# Patient Record
Sex: FEMALE | URBAN - METROPOLITAN AREA
[De-identification: names, ages, dates, MRNs, and addresses within clinical notes are randomized per-mention and may not be internally consistent; named-entity substitution may affect disease eponyms.]

---

## 2019-04-17 ENCOUNTER — OFFICE VISIT (OUTPATIENT)
Dept: URBAN - METROPOLITAN AREA CLINIC 33 | Facility: CLINIC | Age: 74
End: 2019-04-17
Payer: COMMERCIAL

## 2019-04-17 DIAGNOSIS — E11.9 TYPE 2 DIABETES MELLITUS W/O COMPLICATION: Primary | ICD-10-CM

## 2019-04-17 DIAGNOSIS — H25.13 AGE-RELATED NUCLEAR CATARACT, BILATERAL: ICD-10-CM

## 2019-04-17 PROCEDURE — 99204 OFFICE O/P NEW MOD 45 MIN: CPT | Performed by: OPTOMETRIST

## 2019-04-17 ASSESSMENT — INTRAOCULAR PRESSURE
OS: 18
OD: 17

## 2019-04-17 NOTE — IMPRESSION/PLAN
Impression: Anatomical narrow angle of bilateral eye: H40.033. Plan: Patent PI OU. No glaucomatous changes to [place] 74. IOP WNL.  Recommend return for dilation

## 2019-04-17 NOTE — IMPRESSION/PLAN
Impression: Type 2 diabetes mellitus w/o complication: F58.4. Plan: Patient deferred dilation. No bleeding detected at this time. Recommend patient return for dilated exam to reevaluate diabetes. Diabetes w/o Complications: No signs of diabetic retinopathy noted. No treatment necessary at this time. Patient was instructed to monitor vision for sudden changes and to call if visual changes noted. Discussed ocular and systemic benefits of blood sugar control. Continue management with PCP. Letter sent to PCP regarding status of ocular health.

## 2021-05-06 ENCOUNTER — OFFICE VISIT (OUTPATIENT)
Dept: URBAN - METROPOLITAN AREA CLINIC 33 | Facility: CLINIC | Age: 76
End: 2021-05-06
Payer: COMMERCIAL

## 2021-05-06 PROCEDURE — 99214 OFFICE O/P EST MOD 30 MIN: CPT | Performed by: OPTOMETRIST

## 2021-05-06 ASSESSMENT — INTRAOCULAR PRESSURE
OS: 18
OD: 17

## 2021-05-06 NOTE — IMPRESSION/PLAN
Impression: Type 2 diabetes mellitus w/o complication: M67.3. Plan: Diabetes w/o Complications: No signs of diabetic retinopathy noted. No treatment necessary at this time. Patient was instructed to monitor vision for sudden changes and to call if visual changes noted. Discussed ocular and systemic benefits of blood sugar control. Continue management with PCP. Letter sent to PCP regarding status of ocular health.

## 2021-05-06 NOTE — IMPRESSION/PLAN
Impression: Refractive amblyopia, right eye: H53.021. Plan: Refractive amblyopia OD. No treatment at this time.  12 month f/u

## 2021-05-06 NOTE — IMPRESSION/PLAN
Impression: Anatomical narrow angle of bilateral eye: H40.033.

s/p LPI OU 08/10/17 w/ Dr. Carl Prior: Discussed condition with patient. LPI is patent in the right eye only and not patent in the left. Recommend return visit to Dr. Renny Ramos to evaluate and perform LPI OS if needed.

## 2021-05-11 ENCOUNTER — OFFICE VISIT (OUTPATIENT)
Dept: URBAN - METROPOLITAN AREA CLINIC 33 | Facility: CLINIC | Age: 76
End: 2021-05-11
Payer: COMMERCIAL

## 2021-05-11 PROCEDURE — 76514 ECHO EXAM OF EYE THICKNESS: CPT | Performed by: OPHTHALMOLOGY

## 2021-05-11 PROCEDURE — 99213 OFFICE O/P EST LOW 20 MIN: CPT | Performed by: OPHTHALMOLOGY

## 2021-05-11 PROCEDURE — 92133 CPTRZD OPH DX IMG PST SGM ON: CPT | Performed by: OPHTHALMOLOGY

## 2021-05-11 PROCEDURE — 92020 GONIOSCOPY: CPT | Performed by: OPHTHALMOLOGY

## 2021-05-11 ASSESSMENT — INTRAOCULAR PRESSURE
OS: 15
OD: 15
OD: 17
OS: 16

## 2021-05-11 NOTE — IMPRESSION/PLAN
Impression: Anatomical narrow angle of bilateral eye: H40.033.

s/p LPI OU 08/10/17 w/ Dr. Gail Spears: Discussed condition with patient. LPI patent OU, shallow periphery OS. No LPI needed at this time. Will continue to monitor. Pt defer dilation today.

## 2021-05-11 NOTE — IMPRESSION/PLAN
Impression: Type 2 diabetes mellitus w/o complication: O46.7. Plan: Diabetes w/o Complications: No signs of diabetic retinopathy noted. No treatment necessary at this time. Patient was instructed to monitor vision for sudden changes and to call if visual changes noted. Discussed ocular and systemic benefits of blood sugar control.  * NEEDS DFE at Next visit*

## 2021-05-11 NOTE — IMPRESSION/PLAN
Impression: Refractive amblyopia, right eye: H53.021. Plan: Refractive amblyopia OD. No treatment at this time.

## 2021-05-25 ENCOUNTER — OFFICE VISIT (OUTPATIENT)
Dept: URBAN - METROPOLITAN AREA CLINIC 33 | Facility: CLINIC | Age: 76
End: 2021-05-25
Payer: COMMERCIAL

## 2021-05-25 PROCEDURE — 99214 OFFICE O/P EST MOD 30 MIN: CPT | Performed by: OPHTHALMOLOGY

## 2021-05-25 ASSESSMENT — INTRAOCULAR PRESSURE
OS: 18
OD: 18

## 2021-05-25 NOTE — IMPRESSION/PLAN
Impression: Type 2 diabetes mellitus w/o complication: V25.4. Plan: Diabetes w/o Complications: No signs of diabetic retinopathy noted. No treatment necessary at this time. Patient was instructed to monitor vision for sudden changes and to call if visual changes noted. Discussed ocular and systemic benefits of blood sugar control.  * NEEDS DFE at Next visit*

## 2021-05-25 NOTE — IMPRESSION/PLAN
Impression: Anatomical narrow angle of bilateral eye: H40.033.

s/p LPI OU 08/10/17 w/ Dr. Corey Ramos: Discussed condition with patient. LPI patent OU, shallow periphery OS. No LPI needed at this time. Will continue to monitor. Pt defer dilation today.

## 2021-05-25 NOTE — IMPRESSION/PLAN
Impression: Hypertensive retinopathy, bilateral: H35.033. Plan: Patient reports hx of HTN. Advised importance of good bp control and f/u with PCP.

## 2021-12-14 ENCOUNTER — OFFICE VISIT (OUTPATIENT)
Dept: URBAN - METROPOLITAN AREA CLINIC 33 | Facility: CLINIC | Age: 76
End: 2021-12-14
Payer: COMMERCIAL

## 2021-12-14 DIAGNOSIS — H35.033 HYPERTENSIVE RETINOPATHY, BILATERAL: Primary | ICD-10-CM

## 2021-12-14 DIAGNOSIS — H25.11 AGE-RELATED NUCLEAR CATARACT, RIGHT EYE: ICD-10-CM

## 2021-12-14 DIAGNOSIS — H40.033 ANATOMICAL NARROW ANGLE OF BILATERAL EYE: ICD-10-CM

## 2021-12-14 DIAGNOSIS — H53.021 REFRACTIVE AMBLYOPIA, RIGHT EYE: ICD-10-CM

## 2021-12-14 PROCEDURE — 99214 OFFICE O/P EST MOD 30 MIN: CPT | Performed by: OPHTHALMOLOGY

## 2021-12-14 RX ORDER — PREDNISOLONE ACETATE 10 MG/ML
1 % SUSPENSION/ DROPS OPHTHALMIC
Qty: 10 | Refills: 1 | Status: ACTIVE
Start: 2021-12-14

## 2021-12-14 RX ORDER — OFLOXACIN 3 MG/ML
0.3 % SOLUTION/ DROPS OPHTHALMIC
Qty: 10 | Refills: 1 | Status: ACTIVE
Start: 2021-12-14

## 2021-12-14 RX ORDER — KETOROLAC TROMETHAMINE 4 MG/ML
0.4 % SOLUTION/ DROPS OPHTHALMIC
Qty: 10 | Refills: 0 | Status: INACTIVE
Start: 2021-12-14 | End: 2021-12-14

## 2021-12-14 ASSESSMENT — KERATOMETRY
OD: 44.38
OS: 44.63

## 2021-12-14 ASSESSMENT — INTRAOCULAR PRESSURE
OD: 18
OS: 18

## 2021-12-14 NOTE — IMPRESSION/PLAN
Impression: Age-related nuclear cataract, right eye: H25.11. Plan: May proceed with 2nd eye once 1st eye is cleared.

## 2021-12-14 NOTE — IMPRESSION/PLAN
Impression: Refractive amblyopia, right eye: H53.021. Plan: Refractive amblyopia OD. Monitor.   Discussed with patient will limit the vision post cataract surgery

## 2021-12-14 NOTE — IMPRESSION/PLAN
Impression: Type 2 diabetes mellitus w/o complication: M03.3. Plan: Diabetes w/o Complications: No signs of diabetic retinopathy noted. No treatment necessary at this time. Patient was instructed to monitor vision for sudden changes and to call if visual changes noted. Discussed ocular and systemic benefits of blood sugar control.   Discussed with patient will limit the vision post cataract surgery

## 2021-12-14 NOTE — IMPRESSION/PLAN
Impression: Age-related nuclear cataract, bilateral: H25.13. Plan: Patients cataract are visually significant and affecting patients daily activities. Okay to proceed with cataract surgery. Discussed risks, benefits and alternatives to surgery including but not limited to: bleeding, infection, risk of vision loss, loss of the eye, need for other surgery. Patient voiced understanding and wishes to proceed. LEFT EYE THEN RIGHT EYE     * NEEDS ASCAN TESTING PRIOR TO SX*
 **Hx of OD amblyopia RL2
LENS: [id number] AIM: Distance [place]: 
ORA: 
NO DEXYCU OU  ** DROPS ONLY** 
MILD RPE changes OU Pt understands will need glasses for BCVA after Sx.

## 2021-12-14 NOTE — IMPRESSION/PLAN
Impression: Anatomical narrow angle of bilateral eye: H40.033.

s/p LPI OU 08/10/17 w/ Dr. Elsa Vigil: Discussed condition with patient. LPI patent OU, shallow periphery OS. No LPI needed at this time. Will continue to monitor.  Plan for Cat Sx sooner than later

## 2022-01-19 ENCOUNTER — TESTING ONLY (OUTPATIENT)
Dept: URBAN - METROPOLITAN AREA CLINIC 33 | Facility: CLINIC | Age: 77
End: 2022-01-19
Payer: COMMERCIAL

## 2022-01-19 ASSESSMENT — PACHYMETRY
OD: 2.89
OS: 21.43
OS: 3.24
OD: 21.24

## 2022-02-01 ENCOUNTER — SURGERY (OUTPATIENT)
Dept: URBAN - METROPOLITAN AREA SURGERY 15 | Facility: SURGERY | Age: 77
End: 2022-02-01
Payer: COMMERCIAL

## 2022-02-01 PROCEDURE — 66984 XCAPSL CTRC RMVL W/O ECP: CPT | Performed by: OPHTHALMOLOGY

## 2022-02-02 ENCOUNTER — POST-OPERATIVE VISIT (OUTPATIENT)
Dept: URBAN - METROPOLITAN AREA CLINIC 33 | Facility: CLINIC | Age: 77
End: 2022-02-02
Payer: COMMERCIAL

## 2022-02-02 DIAGNOSIS — Z48.810 ENCOUNTER FOR SURGICAL AFTERCARE FOLLOWING SURGERY ON A SENSE ORGAN: Primary | ICD-10-CM

## 2022-02-02 PROCEDURE — 99024 POSTOP FOLLOW-UP VISIT: CPT | Performed by: OPTOMETRIST

## 2022-02-02 ASSESSMENT — INTRAOCULAR PRESSURE
OD: 15
OS: 16

## 2022-02-02 NOTE — IMPRESSION/PLAN
Impression: S/P Cataract Extraction by phacoemulsification with IOL placement OS - 1 Day. Encounter for surgical aftercare following surgery on a sense organ  Z48.810. Post operative instructions reviewed - Condition is improving. Discussed medication instructions with patient. Dilate OS at next appointment.  Plan: --Continue Ofloxacin 0.3% QID x 1 wk then D/C 
--Taper Prednisolone acetate 1% QID x 2 wks, TID x 1 wk, BID x 1wk, QD x 1wk, then d/c

## 2022-02-09 ENCOUNTER — POST-OPERATIVE VISIT (OUTPATIENT)
Dept: URBAN - METROPOLITAN AREA CLINIC 33 | Facility: CLINIC | Age: 77
End: 2022-02-09
Payer: COMMERCIAL

## 2022-02-09 PROCEDURE — 99024 POSTOP FOLLOW-UP VISIT: CPT | Performed by: OPTOMETRIST

## 2022-02-09 ASSESSMENT — INTRAOCULAR PRESSURE
OS: 13
OD: 13

## 2022-02-09 ASSESSMENT — VISUAL ACUITY: OS: 20/40

## 2022-02-09 NOTE — IMPRESSION/PLAN
Impression: S/P Cataract Extraction by phacoemulsification with IOL placement OS - 8 Days. Encounter for surgical aftercare following surgery on a sense organ  Z48.810. Resume full activity. Keep CE OD with Dr. Sharri Beckwith. Plan: --Advised patient to use artificial tears for comfort.

## 2022-02-15 ENCOUNTER — SURGERY (OUTPATIENT)
Dept: URBAN - METROPOLITAN AREA SURGERY 15 | Facility: SURGERY | Age: 77
End: 2022-02-15
Payer: COMMERCIAL

## 2022-02-15 PROCEDURE — 66984 XCAPSL CTRC RMVL W/O ECP: CPT | Performed by: OPHTHALMOLOGY

## 2022-02-16 ENCOUNTER — POST-OPERATIVE VISIT (OUTPATIENT)
Dept: URBAN - METROPOLITAN AREA CLINIC 33 | Facility: CLINIC | Age: 77
End: 2022-02-16
Payer: COMMERCIAL

## 2022-02-16 DIAGNOSIS — Z96.1 PRESENCE OF INTRAOCULAR LENS: Primary | ICD-10-CM

## 2022-02-16 PROCEDURE — 99024 POSTOP FOLLOW-UP VISIT: CPT | Performed by: OPTOMETRIST

## 2022-02-16 ASSESSMENT — INTRAOCULAR PRESSURE
OD: 12
OS: 16

## 2022-02-16 NOTE — IMPRESSION/PLAN
Impression: S/P Cataract Extraction by phacoemulsification with IOL placement OD - 1 Day. Presence of intraocular lens  Z96.1. Post operative instructions reviewed. Decreased BCVA OD due to amblyopia. Dilate OD at PO2.  Plan: --Continue Ofloxacin 0.3% QID OD x 1 wk then D/C
--Taper Prednisolone acetate 1% QID x 2 wks, TID x 1 wk, BID x 1wk, QD x 1wk, then d/c
--Taper Prednisolone OS as directed on sheets

## 2022-02-23 ENCOUNTER — POST-OPERATIVE VISIT (OUTPATIENT)
Dept: URBAN - METROPOLITAN AREA CLINIC 33 | Facility: CLINIC | Age: 77
End: 2022-02-23

## 2022-02-23 PROCEDURE — 99024 POSTOP FOLLOW-UP VISIT: CPT | Performed by: OPTOMETRIST

## 2022-02-23 ASSESSMENT — INTRAOCULAR PRESSURE
OS: 8
OD: 9

## 2022-02-23 ASSESSMENT — VISUAL ACUITY
OS: 20/40
OD: 20/70

## 2022-02-23 NOTE — IMPRESSION/PLAN
Impression: S/P Cataract Extraction by phacoemulsification with IOL placement OD - 8 Days. Presence of intraocular lens  Z96.1. Discussed glasses RX will be issued at PO3. Return for MRX. Lazy eye OD.  Plan: --Taper Prednisolone acetate 1% QID OU x 1 wk, TID OU x 1 wk, BID OU x 1wk, QD OU x 1wk, then d/c
--Stop Ofloxacin

## 2022-03-11 ENCOUNTER — POST-OPERATIVE VISIT (OUTPATIENT)
Dept: URBAN - METROPOLITAN AREA CLINIC 33 | Facility: CLINIC | Age: 77
End: 2022-03-11
Payer: COMMERCIAL

## 2022-03-11 DIAGNOSIS — H52.4 PRESBYOPIA: ICD-10-CM

## 2022-03-11 PROCEDURE — 99024 POSTOP FOLLOW-UP VISIT: CPT | Performed by: OPTOMETRIST

## 2022-03-11 ASSESSMENT — KERATOMETRY
OS: 44.88
OD: 45.13

## 2022-03-11 ASSESSMENT — INTRAOCULAR PRESSURE
OS: 14
OD: 14

## 2022-03-11 ASSESSMENT — VISUAL ACUITY
OS: 20/30
OD: 20/80

## 2022-03-11 NOTE — IMPRESSION/PLAN
Impression: S/P Cataract Extraction by phacoemulsification with IOL placement OD - 24 Days. Presence of intraocular lens  Z96.1. Issued new glasses RX today. Decreased BCVA OD due to refractive amblyopia.  Plan:

## 2022-05-03 ENCOUNTER — POST-OPERATIVE VISIT (OUTPATIENT)
Dept: URBAN - METROPOLITAN AREA CLINIC 33 | Facility: CLINIC | Age: 77
End: 2022-05-03
Payer: COMMERCIAL

## 2022-05-03 DIAGNOSIS — Z48.810 ENCOUNTER FOR SURGICAL AFTERCARE FOLLOWING SURGERY ON A SENSE ORGAN: Primary | ICD-10-CM

## 2022-05-03 PROCEDURE — 99024 POSTOP FOLLOW-UP VISIT: CPT | Performed by: OPHTHALMOLOGY

## 2022-05-03 ASSESSMENT — INTRAOCULAR PRESSURE
OD: 15
OS: 15

## 2022-05-03 NOTE — IMPRESSION/PLAN
Impression: S/P Cataract Extraction by phacoemulsification with IOL placement OD - 77 Days. Encounter for surgical aftercare following surgery on a sense organ  Z48.810. Plan: Dry eyes account for the patient's complaints. There is no evidence of permanent changes to the cornea. Explained condition does not have a cure and will need artificial tears for maintenance. Advised patient to use AT 2-3 times daily and gel at night. Discussed with patient for comfort gave sample of Lotemax SM BID OU and advised patient to use for 7 days only discussed side effects of steroids Addressed for patient to space drops 5 min between each. 
Return in 2 mo if no improvement and will consider punctual plugs at next visit

## 2023-09-29 ENCOUNTER — OFFICE VISIT (OUTPATIENT)
Dept: URBAN - METROPOLITAN AREA CLINIC 33 | Facility: CLINIC | Age: 78
End: 2023-09-29
Payer: COMMERCIAL

## 2023-09-29 DIAGNOSIS — H53.021 REFRACTIVE AMBLYOPIA, RIGHT EYE: ICD-10-CM

## 2023-09-29 DIAGNOSIS — H10.45 OTHER CHRONIC ALLERGIC CONJUNCTIVITIS: ICD-10-CM

## 2023-09-29 DIAGNOSIS — E11.3293 TYPE 2 DIAB W MILD NONPRLF DIABETIC RTNOP W/O MACULAR EDEMA, BILATERAL: Primary | ICD-10-CM

## 2023-09-29 DIAGNOSIS — Z96.1 PRESENCE OF INTRAOCULAR LENS: ICD-10-CM

## 2023-09-29 DIAGNOSIS — H04.123 TEAR FILM INSUFFICIENCY OF BILATERAL LACRIMAL GLANDS: ICD-10-CM

## 2023-09-29 PROCEDURE — 99213 OFFICE O/P EST LOW 20 MIN: CPT

## 2023-09-29 ASSESSMENT — KERATOMETRY
OD: 44.25
OS: 44.63

## 2023-09-29 ASSESSMENT — VISUAL ACUITY
OD: 20/40
OS: 20/25

## 2023-09-29 ASSESSMENT — INTRAOCULAR PRESSURE
OD: 15
OS: 15